# Patient Record
Sex: MALE | Race: BLACK OR AFRICAN AMERICAN | NOT HISPANIC OR LATINO | Employment: UNEMPLOYED | URBAN - METROPOLITAN AREA
[De-identification: names, ages, dates, MRNs, and addresses within clinical notes are randomized per-mention and may not be internally consistent; named-entity substitution may affect disease eponyms.]

---

## 2021-07-01 ENCOUNTER — OFFICE VISIT (OUTPATIENT)
Dept: FAMILY MEDICINE | Facility: CLINIC | Age: 21
End: 2021-07-01
Payer: MEDICAID

## 2021-07-01 VITALS
WEIGHT: 146.19 LBS | DIASTOLIC BLOOD PRESSURE: 79 MMHG | HEART RATE: 53 BPM | BODY MASS INDEX: 25.9 KG/M2 | HEIGHT: 63 IN | SYSTOLIC BLOOD PRESSURE: 121 MMHG | RESPIRATION RATE: 16 BRPM | TEMPERATURE: 99 F | OXYGEN SATURATION: 99 %

## 2021-07-01 DIAGNOSIS — Z00.00 ROUTINE ADULT HEALTH MAINTENANCE: Primary | ICD-10-CM

## 2021-07-01 DIAGNOSIS — Z11.59 ENCOUNTER FOR SCREENING FOR OTHER VIRAL DISEASES: ICD-10-CM

## 2021-07-01 LAB
ABS NRBC COUNT: 0 X 10 3/UL (ref 0–0.01)
ABSOLUTE BASOPHIL: 0.03 X 10 3/UL (ref 0–0.22)
ABSOLUTE EOSINOPHIL: 0.06 X 10 3/UL (ref 0.04–0.54)
ABSOLUTE IMMATURE GRAN: 0 X 10 3/UL (ref 0–0.04)
ABSOLUTE LYMPHOCYTE: 1.67 X 10 3/UL (ref 0.86–4.75)
ABSOLUTE MONOCYTE: 0.5 X 10 3/UL (ref 0.22–1.08)
ALBUMIN SERPL-MCNC: 4.3 G/DL (ref 3.5–5.2)
ALBUMIN/GLOB SERPL ELPH: 1.7 {RATIO} (ref 1–2.7)
ALP ISOS SERPL LEV INH-CCNC: 58 U/L (ref 40–130)
ALT (SGPT): 19 U/L (ref 0–41)
ANION GAP SERPL CALC-SCNC: 8 MMOL/L (ref 8–17)
AST SERPL-CCNC: 20 U/L (ref 0–40)
BASOPHILS NFR BLD: 0.7 % (ref 0.2–1.2)
BILIRUBIN, TOTAL: 0.24 MG/DL (ref 0–1.2)
BUN/CREAT SERPL: 15.1 (ref 6–20)
CALCIUM SERPL-MCNC: 9.6 MG/DL (ref 8.6–10.2)
CARBON DIOXIDE, CO2: 27 MMOL/L (ref 22–29)
CHLORIDE: 105 MMOL/L (ref 98–107)
CHOLEST SERPL-MSCNC: 151 MG/DL (ref 100–200)
CREAT SERPL-MCNC: 1.01 MG/DL (ref 0.7–1.2)
EOSINOPHIL NFR BLD: 1.3 % (ref 0.7–7)
GFR ESTIMATION: 94.18
GLOBULIN: 2.6 G/DL (ref 1.5–4.5)
GLUCOSE: 89 MG/DL (ref 74–106)
HCT VFR BLD AUTO: 40.9 % (ref 42–52)
HCV IGG SERPL QL IA: NONREACTIVE
HDLC SERPL-MCNC: 49 MG/DL
HGB BLD-MCNC: 13.5 G/DL (ref 14–18)
HIV 1+2 AB+HIV1 P24 AG SERPL QL IA: NONREACTIVE
IMMATURE GRANULOCYTES: 0 % (ref 0–0.5)
LDL/HDL RATIO: 1.8 (ref 1–3)
LDLC SERPL CALC-MCNC: 88.2 MG/DL (ref 0–100)
LYMPHOCYTES NFR BLD: 37.2 % (ref 19.3–53.1)
MCH RBC QN AUTO: 29.1 PG (ref 27–32)
MCHC RBC AUTO-ENTMCNC: 33 G/DL (ref 32–36)
MCV RBC AUTO: 88.1 FL (ref 80–94)
MONOCYTES NFR BLD: 11.1 % (ref 4.7–12.5)
NEUTROPHILS # BLD AUTO: 2.23 X 10 3/UL (ref 2.15–7.56)
NEUTROPHILS NFR BLD: 49.7 % (ref 34–71.1)
NUCLEATED RED BLOOD CELLS: 0 /100 WBC (ref 0–0.2)
PLATELET # BLD AUTO: 197 X 10 3/UL (ref 135–400)
POTASSIUM: 4.8 MMOL/L (ref 3.5–5.1)
PROT SNV-MCNC: 6.9 G/DL (ref 6.4–8.3)
RBC # BLD AUTO: 4.64 X 10 6/UL (ref 4.7–6.1)
RDW-SD: 39.7 FL (ref 37–54)
SODIUM: 140 MMOL/L (ref 136–145)
TRIGL SERPL-MCNC: 69 MG/DL (ref 0–150)
TSH SERPL DL<=0.005 MIU/L-ACNC: 1.63 UIU/ML (ref 0.27–4.2)
UREA NITROGEN (BUN): 15.3 MG/DL (ref 6–20)
WBC # BLD: 4.49 X 10 3/UL (ref 4.3–10.8)

## 2021-07-01 PROCEDURE — 99203 PR OFFICE/OUTPT VISIT, NEW, LEVL III, 30-44 MIN: ICD-10-PCS | Mod: S$GLB,,, | Performed by: OBSTETRICS & GYNECOLOGY

## 2021-07-01 PROCEDURE — 99203 OFFICE O/P NEW LOW 30 MIN: CPT | Mod: S$GLB,,, | Performed by: OBSTETRICS & GYNECOLOGY

## 2021-07-02 ENCOUNTER — TELEPHONE (OUTPATIENT)
Dept: FAMILY MEDICINE | Facility: CLINIC | Age: 21
End: 2021-07-02

## 2025-04-23 ENCOUNTER — OFFICE VISIT (OUTPATIENT)
Dept: FAMILY MEDICINE | Facility: CLINIC | Age: 25
End: 2025-04-23
Payer: COMMERCIAL

## 2025-04-23 ENCOUNTER — TELEPHONE (OUTPATIENT)
Dept: FAMILY MEDICINE | Facility: CLINIC | Age: 25
End: 2025-04-23

## 2025-04-23 VITALS
HEART RATE: 76 BPM | SYSTOLIC BLOOD PRESSURE: 120 MMHG | OXYGEN SATURATION: 98 % | RESPIRATION RATE: 15 BRPM | DIASTOLIC BLOOD PRESSURE: 70 MMHG | TEMPERATURE: 98 F | WEIGHT: 161.5 LBS | HEIGHT: 63 IN | BODY MASS INDEX: 28.62 KG/M2

## 2025-04-23 DIAGNOSIS — N50.812 PAIN IN BOTH TESTICLES: ICD-10-CM

## 2025-04-23 DIAGNOSIS — Z20.2 EXPOSURE TO STD: Primary | ICD-10-CM

## 2025-04-23 DIAGNOSIS — N50.811 PAIN IN BOTH TESTICLES: ICD-10-CM

## 2025-04-23 DIAGNOSIS — Z00.00 ANNUAL PHYSICAL EXAM: ICD-10-CM

## 2025-04-23 LAB
ABS NRBC COUNT: 0 X 10 3/UL (ref 0–0.01)
ABSOLUTE BASOPHIL: 0.04 X 10 3/UL (ref 0–0.22)
ABSOLUTE EOSINOPHIL: 0.08 X 10 3/UL (ref 0.04–0.54)
ABSOLUTE IMMATURE GRAN: 0.01 X 10 3/UL (ref 0–0.04)
ABSOLUTE LYMPHOCYTE: 1.59 X 10 3/UL (ref 0.86–4.75)
ABSOLUTE MONOCYTE: 0.41 X 10 3/UL (ref 0.22–1.08)
ALBUMIN SERPL-MCNC: 4.6 G/DL (ref 3.5–5.2)
ALBUMIN/GLOB SERPL ELPH: 1.4 {RATIO} (ref 1–2.7)
ALP ISOS SERPL LEV INH-CCNC: 64 U/L (ref 40–130)
ALT (SGPT): 14 U/L (ref 0–41)
AMORPH URATE CRY URNS QL MICRO: ABNORMAL
ANION GAP SERPL CALC-SCNC: 11 MMOL/L (ref 8–17)
AST SERPL-CCNC: 21 U/L (ref 0–40)
BACTERIA #/AREA URNS HPF: ABNORMAL /[HPF]
BASOPHILS NFR BLD: 0.8 % (ref 0.2–1.2)
BILIRUB UR QL STRIP: NEGATIVE
BILIRUBIN, TOTAL: 0.58 MG/DL (ref 0–1.2)
BUN/CREAT SERPL: 11.3 (ref 6–20)
CALCIUM SERPL-MCNC: 10.1 MG/DL (ref 8.6–10.2)
CARBON DIOXIDE, CO2: 27 MMOL/L (ref 22–29)
CHLORIDE: 100 MMOL/L (ref 98–107)
CLARITY UR: CLEAR
COLOR UR: YELLOW
CREAT SERPL-MCNC: 1.08 MG/DL (ref 0.7–1.2)
CREAT UR-MCNC: 117.2 MG/DL (ref 39–259)
EOSINOPHIL NFR BLD: 1.7 % (ref 0.7–7)
EPITHELIAL CELLS: NEGATIVE
ESTIMATED AVERAGE GLUCOSE: 112 MG/DL (ref 70–126)
GFR ESTIMATION: 98.28 ML/MIN/1.73M2
GLOBULIN: 3.2 G/DL (ref 1.5–4.5)
GLUCOSE (UA): NEGATIVE MG/DL
GLUCOSE: 89 MG/DL (ref 74–106)
HBA1C MFR BLD: 5.5 % (ref 4–5.6)
HCT VFR BLD AUTO: 43.2 % (ref 42–52)
HCV IGG SERPL QL IA: NONREACTIVE
HGB BLD-MCNC: 14 G/DL (ref 14–18)
HIV 1+2 AB+HIV1 P24 AG SERPL QL IA: NONREACTIVE
IMMATURE GRANULOCYTES: 0.2 % (ref 0–0.5)
KETONES UR QL STRIP: NEGATIVE MG/DL
LEUKOCYTE ESTERASE UR QL STRIP: NEGATIVE
LYMPHOCYTES NFR BLD: 33.5 % (ref 19.3–53.1)
MCH RBC QN AUTO: 28.4 PG (ref 27–32)
MCHC RBC AUTO-ENTMCNC: 32.4 G/DL (ref 32–36)
MCV RBC AUTO: 87.6 FL (ref 80–94)
MICROALBUMIN QUANT: <1.2 MG/DL (ref 0–2)
MICROALBUMIN/CREATININE RATIO: NORMAL UG/MG (ref 0–30)
MONOCYTES NFR BLD: 8.6 % (ref 4.7–12.5)
MUCOUS THREADS URNS QL MICRO: ABNORMAL
NEUTROPHILS # BLD AUTO: 2.61 X 10 3/UL (ref 2.15–7.56)
NEUTROPHILS NFR BLD: 55.2 % (ref 34–71.1)
NITRITE UR QL STRIP: NEGATIVE
NUCLEATED RED BLOOD CELLS: 0 /100 WBC (ref 0–0.2)
OCCULT BLOOD: NEGATIVE
PH, URINE: 6 (ref 5–7.5)
PLATELET # BLD AUTO: 209 X 10 3/UL (ref 135–400)
POTASSIUM: 3.9 MMOL/L (ref 3.5–5.1)
PROT SNV-MCNC: 7.8 G/DL (ref 6.4–8.3)
PROT UR QL STRIP: NEGATIVE MG/DL
RBC # BLD AUTO: 4.93 X 10 6/UL (ref 4.7–6.1)
RBC/HPF: NEGATIVE
RDW-SD: 39.1 FL (ref 37–54)
SODIUM: 138 MMOL/L (ref 136–145)
SP GR UR STRIP: 1.01 (ref 1–1.03)
SYPHILIS TREPONEMAL ANTIBODY: NONREACTIVE
T4, FREE: 1.49 NG/DL (ref 0.93–1.7)
TSH SERPL DL<=0.005 MIU/L-ACNC: 1.3 UIU/ML (ref 0.27–4.2)
UREA NITROGEN (BUN): 12.2 MG/DL (ref 6–20)
UROBILINOGEN, URINE: NORMAL E.U./DL (ref 0–1)
WBC # BLD: 4.74 X 10 3/UL (ref 4.3–10.8)
WBC/HPF: NEGATIVE

## 2025-04-23 NOTE — TELEPHONE ENCOUNTER
----- Message from Josseline sent at 4/23/2025 11:19 AM CDT -----  Pathology lab would like a call back in regards to two different orders..please call them at [phone]...

## 2025-04-23 NOTE — TELEPHONE ENCOUNTER
Wanted to make sure we wanted RPR ran.  Advised lab, yes to run RPR.  [facility] verbalized understanding.

## 2025-04-23 NOTE — PROGRESS NOTES
"Patient ID: Nelson Louis  MRN: [id number]      History of Present Illness:  The patient is a 25 y.o. Black or  male who presents to clinic for evaluation and management with a chief complaint of Establish Care (New pt. To est care.  C/o Lt. Testicle pain radiating to lower abdomen and inner thighs x "months".  )     Pt states he has tested positive for Chlamydia in the past but no other STD 's however he thinks he has been exposed. He generally tries to use protection but would like to be tested. His pain is in both testicles, but he said primarily the left lower area of testicle extending into the groin. He describes the pain as a squeezing dull intermittent discomfort that is has been several months in duration. He denies any urethral discharge , blood in urine or any other physical complaints. No skin lesions.     Allergies: Patient has no known allergies.     Smoking status:  Tobacco Use History[1]    Problem List:  Problem List[2]     Current Medications:  No current outpatient medications        Review of Systems   Constitutional: Negative.    HENT: Negative.     Eyes: Negative.    Respiratory: Negative.     Cardiovascular: Negative.    Gastrointestinal: Negative.    Endocrine: Negative.    Genitourinary:  Positive for testicular pain. Negative for genital sores.        See HPI    Musculoskeletal: Negative.    Integumentary:  Negative.   Allergic/Immunologic: Negative.    Neurological: Negative.    Hematological: Negative.    Psychiatric/Behavioral: Negative.          Visit Vitals  /70 (BP Location: Left arm, Patient Position: Sitting)   Pulse 76   Temp 98.1 °F (36.7 °C) (Oral)   Resp 15   Ht 5' 3" (1.6 m)   Wt 73.3 kg (161 lb 8 oz)   SpO2 98%   BMI 28.61 kg/m²       Physical Exam  Vitals and nursing note reviewed.   Constitutional:       Appearance: Normal appearance.   HENT:      Head: Normocephalic.      Nose: Nose normal.      Mouth/Throat:      Mouth: Mucous membranes are moist.      " Pharynx: Oropharynx is clear.   Eyes:      Conjunctiva/sclera: Conjunctivae normal.   Cardiovascular:      Rate and Rhythm: Normal rate and regular rhythm.   Pulmonary:      Effort: Pulmonary effort is normal.      Breath sounds: Normal breath sounds.   Abdominal:      General: Bowel sounds are normal.      Palpations: Abdomen is soft.   Genitourinary:     Testes: Normal.      Comments: Chaperone present in room and both testicles examined and palpated. There was no swelling or nodules or tenderness noted per patient during palpation. There were no inguinal hernias.   Musculoskeletal:         General: Normal range of motion.      Cervical back: Normal range of motion.   Skin:     General: Skin is warm and dry.   Neurological:      General: No focal deficit present.      Mental Status: He is alert.   Psychiatric:         Mood and Affect: Mood normal.         Behavior: Behavior normal.          Assessment & Plan:  1. Exposure to STD  -     C. trachomatis/N. gonorrhoeae by AMP DNA [facility]; Urine  -     HIV 1/2 Ag/Ab (4th Gen); Future; Expected date: 04/23/2025  -     Hepatitis C Antibody; Future; Expected date: 04/23/2025  -     RPR; Future; Expected date: 04/23/2025    2. Annual physical exam  -     Comprehensive Metabolic Panel; Future; Expected date: 04/23/2025  -     CBC Auto Differential; Future; Expected date: 04/23/2025  -     Microalbumin/Creatinine Ratio, Urine  -     Urinalysis  -     Hemoglobin A1C; Future; Expected date: 04/23/2025  -     TSH; Future; Expected date: 04/23/2025  -     T4, Free    3. Pain in both testicles  -     US Scrotum And Testicles; Future; Expected date: 04/23/2025     Discussed that we need to do a testicular ultrasound as testicular cancer risk is increased in males in his age group, and while I cannot palpate any irregularity an ultrasound may reveal greater details. Will consider urology referral as well.     Lab Frequency Next Occurrence   US Scrotum And Testicles Once 04/23/2025          Follow up if symptoms worsen or fail to improve.    Vero Perry NP                [1]   Social History  Tobacco Use   Smoking Status Former    Types: Cigarettes    Passive exposure: Never   Smokeless Tobacco Never   [2] There is no problem list on file for this patient.

## 2025-04-24 ENCOUNTER — RESULTS FOLLOW-UP (OUTPATIENT)
Dept: FAMILY MEDICINE | Facility: CLINIC | Age: 25
End: 2025-04-24

## 2025-05-05 ENCOUNTER — TELEPHONE (OUTPATIENT)
Dept: FAMILY MEDICINE | Facility: CLINIC | Age: 25
End: 2025-05-05
Payer: COMMERCIAL

## 2025-05-06 ENCOUNTER — TELEPHONE (OUTPATIENT)
Dept: FAMILY MEDICINE | Facility: CLINIC | Age: 25
End: 2025-05-06
Payer: COMMERCIAL

## 2025-05-06 DIAGNOSIS — N43.3 HYDROCELE IN ADULT: Primary | ICD-10-CM

## 2025-05-06 NOTE — TELEPHONE ENCOUNTER
----- Message from Josseline sent at 5/6/2025  3:09 PM CDT -----  Type:  Patient Returning CallWho Called: patient Who Left Message for Patient:  Camron the patient know what this is regarding?:Would the patient rather a call back or a response via [facility]? Call [facility] Call Back Number: [phone] (home) Additional Information:

## 2025-05-06 NOTE — TELEPHONE ENCOUNTER
Please call patient and let him know he does have a very tiny amt of fluid surrounding each testicle and also a very tiny amt of fluid or cyst that usually contains sperm in one part of the testicle. Since he has been having pain I would like him to see urology , so I am sending a referral to Ahsan Mcallister at Dr. Menjivar office, thank you, wk

## 2025-05-07 ENCOUNTER — TELEPHONE (OUTPATIENT)
Dept: FAMILY MEDICINE | Facility: CLINIC | Age: 25
End: 2025-05-07
Payer: COMMERCIAL

## 2025-05-08 ENCOUNTER — OFFICE VISIT (OUTPATIENT)
Dept: UROLOGY | Facility: CLINIC | Age: 25
End: 2025-05-08
Payer: COMMERCIAL

## 2025-05-08 VITALS
RESPIRATION RATE: 18 BRPM | DIASTOLIC BLOOD PRESSURE: 80 MMHG | OXYGEN SATURATION: 99 % | HEART RATE: 87 BPM | BODY MASS INDEX: 27.31 KG/M2 | SYSTOLIC BLOOD PRESSURE: 132 MMHG | HEIGHT: 64 IN | WEIGHT: 160 LBS

## 2025-05-08 DIAGNOSIS — N50.3 EPIDIDYMAL CYST: Primary | ICD-10-CM

## 2025-05-08 DIAGNOSIS — N43.3 HYDROCELE IN ADULT: ICD-10-CM

## 2025-05-08 DIAGNOSIS — N50.812 PAIN IN LEFT TESTICLE: ICD-10-CM

## 2025-05-08 LAB
BILIRUBIN, UA POC OHS: NEGATIVE
BLOOD, UA POC OHS: NEGATIVE
CLARITY, UA POC OHS: CLEAR
COLOR, UA POC OHS: YELLOW
GLUCOSE, UA POC OHS: NEGATIVE
KETONES, UA POC OHS: NEGATIVE
LEUKOCYTES, UA POC OHS: NEGATIVE
NITRITE, UA POC OHS: NEGATIVE
PH, UA POC OHS: 6
PROTEIN, UA POC OHS: NEGATIVE
SPECIFIC GRAVITY, UA POC OHS: 1.02
UROBILINOGEN, UA POC OHS: 0.2

## 2025-05-08 PROCEDURE — 3008F BODY MASS INDEX DOCD: CPT | Mod: CPTII,,, | Performed by: NURSE PRACTITIONER

## 2025-05-08 PROCEDURE — 1160F RVW MEDS BY RX/DR IN RCRD: CPT | Mod: CPTII,,, | Performed by: NURSE PRACTITIONER

## 2025-05-08 PROCEDURE — 1159F MED LIST DOCD IN RCRD: CPT | Mod: CPTII,,, | Performed by: NURSE PRACTITIONER

## 2025-05-08 PROCEDURE — 3075F SYST BP GE 130 - 139MM HG: CPT | Mod: CPTII,,, | Performed by: NURSE PRACTITIONER

## 2025-05-08 PROCEDURE — 3044F HG A1C LEVEL LT 7.0%: CPT | Mod: CPTII,,, | Performed by: NURSE PRACTITIONER

## 2025-05-08 PROCEDURE — 3079F DIAST BP 80-89 MM HG: CPT | Mod: CPTII,,, | Performed by: NURSE PRACTITIONER

## 2025-05-08 PROCEDURE — 99203 OFFICE O/P NEW LOW 30 MIN: CPT | Mod: S$PBB,,, | Performed by: NURSE PRACTITIONER

## 2025-05-08 NOTE — PROGRESS NOTES
Subjective:       Patient ID: Nelson Louis is a 25 y.o. male.    Chief Complaint: Testicle Pain (Left testicle)      HPI: 25-year-old male, new to [facility], referred by primary care.    Patient referred for testicular pain.    Patient states been having episodes of testicular pain off and on.  States his pain is currently 0/10 with episodes of 5/10.  Describes the pain as an aching pain.    States is worse when he is we sits a certain where or with certain activity.    Did have an ultrasound done on 05/02/2025.    Ultrasound showed a tiny bilateral hydroceles, small left testicular calcification, and a 5 mm epididymal cyst/spermatocele.    No masses or torsion noted.      No other urinary complaints at this time.         Past Medical History:   Past Medical History:   Diagnosis Date    ADHD (attention deficit hyperactivity disorder)        Past Surgical Historical: History reviewed. No pertinent surgical history.     Medications:      Past Social History: Social History[1]    Allergies: Review of patient's allergies indicates:  No Known Allergies     Family History:   Family History   Problem Relation Name Age of Onset    Diabetes Mother          Review of Systems:  Review of Systems   Constitutional:  Negative for activity change and appetite change.   HENT:  Negative for congestion and dental problem.    Respiratory:  Negative for chest tightness and shortness of breath.    Cardiovascular:  Negative for chest pain.   Gastrointestinal:  Negative for abdominal distention and abdominal pain.   Genitourinary:  Negative for decreased urine volume, difficulty urinating, dysuria, enuresis, flank pain, frequency, genital sores, hematuria, penile discharge, penile pain, penile swelling, scrotal swelling, testicular pain and urgency.   Musculoskeletal:  Negative for back pain and neck pain.   Neurological:  Negative for dizziness.   Hematological:  Negative for adenopathy.   Psychiatric/Behavioral:  Negative for  agitation, behavioral problems and confusion.        Physical Exam:  Physical Exam  Vitals and nursing note reviewed.   Constitutional:       Appearance: He is well-developed.   HENT:      Head: Normocephalic.   Cardiovascular:      Rate and Rhythm: Normal rate and regular rhythm.      Heart sounds: Normal heart sounds.   Pulmonary:      Effort: Pulmonary effort is normal.      Breath sounds: Normal breath sounds.   Abdominal:      General: Bowel sounds are normal.      Palpations: Abdomen is soft.   Skin:     General: Skin is warm and dry.   Neurological:      Mental Status: He is alert and oriented to person, place, and time.       Urinalysis: Normal    Assessment/Plan:   Left testicular pain/hydrocele/epididymal cyst:  No masses noted on ultrasound.    Patient provided reassurance.  He is very concerned about testicular cancer.  Explain that he has a cyst in no solid masses.  We discussed wearing supportive underwear.  We discussed using over-the-counter NSAIDs as needed.  Patient will notice for any worsening symptoms.      Follow-up as needed  Problem List Items Addressed This Visit    None  Visit Diagnoses         Epididymal cyst    -  Primary    Relevant Orders    POCT Urinalysis(Instrument)      Hydrocele in adult        Relevant Orders    POCT Urinalysis(Instrument)      Pain in left testicle        Relevant Orders    POCT Urinalysis(Instrument)                      [1]   Social History  Socioeconomic History    Marital status: Single   Tobacco Use    Smoking status: Former     Types: Cigarettes     Passive exposure: Never    Smokeless tobacco: Never   Substance and Sexual Activity    Alcohol use: Never    Drug use: Never    Sexual activity: Not Currently     Partners: Female     Birth control/protection: Condom

## 2025-07-10 ENCOUNTER — OFFICE VISIT (OUTPATIENT)
Dept: FAMILY MEDICINE | Facility: CLINIC | Age: 25
End: 2025-07-10
Payer: COMMERCIAL

## 2025-07-10 VITALS
HEART RATE: 73 BPM | HEIGHT: 64 IN | SYSTOLIC BLOOD PRESSURE: 117 MMHG | WEIGHT: 163 LBS | BODY MASS INDEX: 27.83 KG/M2 | DIASTOLIC BLOOD PRESSURE: 79 MMHG | OXYGEN SATURATION: 98 %

## 2025-07-10 DIAGNOSIS — N63.0 BREAST MASS IN MALE: Primary | ICD-10-CM

## 2025-07-10 PROCEDURE — 3074F SYST BP LT 130 MM HG: CPT | Mod: CPTII,S$GLB,, | Performed by: NURSE PRACTITIONER

## 2025-07-10 PROCEDURE — 3044F HG A1C LEVEL LT 7.0%: CPT | Mod: CPTII,S$GLB,, | Performed by: NURSE PRACTITIONER

## 2025-07-10 PROCEDURE — 1160F RVW MEDS BY RX/DR IN RCRD: CPT | Mod: CPTII,S$GLB,, | Performed by: NURSE PRACTITIONER

## 2025-07-10 PROCEDURE — 1159F MED LIST DOCD IN RCRD: CPT | Mod: CPTII,S$GLB,, | Performed by: NURSE PRACTITIONER

## 2025-07-10 PROCEDURE — 3078F DIAST BP <80 MM HG: CPT | Mod: CPTII,S$GLB,, | Performed by: NURSE PRACTITIONER

## 2025-07-10 PROCEDURE — 99213 OFFICE O/P EST LOW 20 MIN: CPT | Mod: S$GLB,,, | Performed by: NURSE PRACTITIONER

## 2025-07-10 PROCEDURE — 3008F BODY MASS INDEX DOCD: CPT | Mod: CPTII,S$GLB,, | Performed by: NURSE PRACTITIONER

## 2025-07-10 NOTE — PROGRESS NOTES
"Patient ID: Nelson Louis  MRN: [id number]      History of Present Illness:  The patient is a 25 y.o. Black or  male who presents to clinic for evaluation and management with a chief complaint of Pain (Left areola. ) I was able to palpate a very small fibrous linear lesion, however the rest of the breast tissue has some fibrous areas as well. Pt said it was slightly tender to palpation , no other accompanying sx .     Allergies: Patient has no known allergies.     Smoking status:  Tobacco Use History[1]    Problem List:  Problem List[2]     Current Medications:  No current outpatient medications        Review of Systems   Constitutional: Negative.    HENT: Negative.     Eyes: Negative.    Respiratory: Negative.     Cardiovascular: Negative.    Gastrointestinal: Negative.    Endocrine: Negative.    Genitourinary: Negative.    Musculoskeletal: Negative.    Integumentary:         Pt presents with concerns about a mass to the left areola area that he said he just recently  noticed and was tender,.    Allergic/Immunologic: Negative.    Neurological: Negative.    Hematological: Negative.    Psychiatric/Behavioral: Negative.          Visit Vitals  /79 (BP Location: Right arm, Patient Position: Sitting)   Pulse 73   Ht 5' 4" (1.626 m)   Wt 73.9 kg (163 lb)   SpO2 98%   BMI 27.98 kg/m²       Physical Exam  Vitals and nursing note reviewed.   Constitutional:       Appearance: Normal appearance.   HENT:      Head: Normocephalic.      Nose: Nose normal.      Mouth/Throat:      Mouth: Mucous membranes are moist.   Eyes:      Conjunctiva/sclera: Conjunctivae normal.   Cardiovascular:      Rate and Rhythm: Normal rate and regular rhythm.   Pulmonary:      Effort: Pulmonary effort is normal.      Breath sounds: Normal breath sounds.   Chest:          Comments: Very tiny grainy type of linear lesion noted just under the skin. Pt said was slightly tender to palpation. The remainder of both breasts were palpated " and he does have slight enlargement of the pectoral muscles and some slight puffiness of both nipple areas however no definitive nodule or mass was palpated.   Musculoskeletal:         General: Normal range of motion.      Cervical back: Normal range of motion.   Skin:     General: Skin is warm and dry.   Neurological:      General: No focal deficit present.      Mental Status: He is alert.   Psychiatric:         Mood and Affect: Mood normal.         Behavior: Behavior normal.          Assessment & Plan:  1. Breast mass in male     Pt was provided reassurance after physical exam in which both breasts were palpated for presence of any unusual lesions, masses, nodules . In the area of concern, he has a very tiny slightly raised grainy feeling linear lesion which was tender,however the remainder of breasts had a grainy fibrous feeling as w ell. No definite nodules noted. Informed pat not unusual for younger men to sometimes have cystic lesions that can sometimes swell up with fluid but also can sometimes just have some fibrous tissue as well, however if the area enlarges, becomes more tender he should follow up and we can then do an ultrasound. Currently area is less than 1 cm palpated.         Follow up if symptoms worsen or fail to improve.      Vero Perry NP                [1]   Social History  Tobacco Use   Smoking Status Former    Types: Cigarettes    Passive exposure: Never   Smokeless Tobacco Never   [2] There is no problem list on file for this patient.

## 2025-08-12 ENCOUNTER — OFFICE VISIT (OUTPATIENT)
Dept: FAMILY MEDICINE | Facility: CLINIC | Age: 25
End: 2025-08-12
Payer: COMMERCIAL

## 2025-08-12 VITALS
HEART RATE: 64 BPM | BODY MASS INDEX: 27.13 KG/M2 | HEIGHT: 64 IN | DIASTOLIC BLOOD PRESSURE: 80 MMHG | OXYGEN SATURATION: 98 % | WEIGHT: 158.88 LBS | RESPIRATION RATE: 15 BRPM | TEMPERATURE: 98 F | SYSTOLIC BLOOD PRESSURE: 120 MMHG

## 2025-08-12 DIAGNOSIS — Z20.2 EXPOSURE TO SEXUALLY TRANSMITTED DISEASE (STD): Primary | ICD-10-CM

## 2025-08-12 PROCEDURE — 1159F MED LIST DOCD IN RCRD: CPT | Mod: CPTII,S$GLB,, | Performed by: NURSE PRACTITIONER

## 2025-08-12 PROCEDURE — 3074F SYST BP LT 130 MM HG: CPT | Mod: CPTII,S$GLB,, | Performed by: NURSE PRACTITIONER

## 2025-08-12 PROCEDURE — 3008F BODY MASS INDEX DOCD: CPT | Mod: CPTII,S$GLB,, | Performed by: NURSE PRACTITIONER

## 2025-08-12 PROCEDURE — 99213 OFFICE O/P EST LOW 20 MIN: CPT | Mod: S$GLB,,, | Performed by: NURSE PRACTITIONER

## 2025-08-12 PROCEDURE — 3044F HG A1C LEVEL LT 7.0%: CPT | Mod: CPTII,S$GLB,, | Performed by: NURSE PRACTITIONER

## 2025-08-12 PROCEDURE — 1160F RVW MEDS BY RX/DR IN RCRD: CPT | Mod: CPTII,S$GLB,, | Performed by: NURSE PRACTITIONER

## 2025-08-12 PROCEDURE — 3079F DIAST BP 80-89 MM HG: CPT | Mod: CPTII,S$GLB,, | Performed by: NURSE PRACTITIONER

## 2025-08-14 LAB
CHLAMYDIA: NEGATIVE
GONORRHEA: NEGATIVE
SOURCE: NORMAL